# Patient Record
Sex: MALE | Race: OTHER | URBAN - METROPOLITAN AREA
[De-identification: names, ages, dates, MRNs, and addresses within clinical notes are randomized per-mention and may not be internally consistent; named-entity substitution may affect disease eponyms.]

---

## 2017-09-21 NOTE — PATIENT DISCUSSION
pt reduced va doesn't improve with refraction, mild reintal changes os with pain reported on left temple.

## 2017-11-07 NOTE — PATIENT DISCUSSION
NONPROLIFERATIVE DIABETIC RETINOPATHY: NO CSDME OU; RETURN FOR FOLLOW-UP AS SCHEDULED FOR DILATED EYE EXAM.

## 2018-01-31 ENCOUNTER — IMPORTED ENCOUNTER (OUTPATIENT)
Dept: URBAN - METROPOLITAN AREA CLINIC 9 | Facility: CLINIC | Age: 53
End: 2018-01-31

## 2018-08-23 NOTE — PATIENT DISCUSSION
NONPROLIFERATIVE DIABETIC RETINOPATHY:  I have talked with the patient about the nature of diabetic retinopathy and the potential for significant visual complications. For this reason regular follow-up with an ophthalmologist is essential.  I have also advised aggressive blood sugar and blood pressure control to minimize the risk of further damage.

## 2018-08-23 NOTE — PATIENT DISCUSSION
NONPROLIFERATIVE DIABETIC RETINOPATHY- CONTINUE TO FOLLOW WITH DR. Audrey Grove, KEEP BS UNDER CONTROL, ANY CHANGES CALL OFFICE FOR AN APPT

## 2018-08-23 NOTE — PATIENT DISCUSSION
CATARACTS ARE VISUALLY SIGNIFICANT .  WILL PROCEED WITH GLAUCOMA TESTING BEFORE SCHEDULING PRE- OP OR ANY SURGERY

## 2018-08-23 NOTE — PATIENT DISCUSSION
DERMATOCHALASIS OU: VISUALLY SIGNIFICANT WILL PROCEED WITH GLAUCOMA WORK UP AND PIN POINTING VISUAL PROBLEM FIRST .

## 2018-10-02 NOTE — PATIENT DISCUSSION
DRY EYES : Discussed with patient the importance of keeping the eye moist and the symptoms associated with dry eyes including blurry vision, tearing, burning, and gisel sensation. Advised patient to minimize use of any fans blowing directly on the face. Advised patient to continue with artificial tears 2-3 times daily.

## 2019-01-14 ENCOUNTER — IMPORTED ENCOUNTER (OUTPATIENT)
Dept: URBAN - METROPOLITAN AREA CLINIC 9 | Facility: CLINIC | Age: 54
End: 2019-01-14

## 2020-07-30 ENCOUNTER — IMPORTED ENCOUNTER (OUTPATIENT)
Dept: URBAN - METROPOLITAN AREA CLINIC 9 | Facility: CLINIC | Age: 55
End: 2020-07-30

## 2021-10-16 ASSESSMENT — KERATOMETRY
OS_K1POWER_DIOPTERS: 7.875
OS_K1POWER_DIOPTERS: 43.25
OS_AXISANGLE2_DEGREES: 179
OD_K1POWER_DIOPTERS: 7.875
OD_K1POWER_DIOPTERS: 43.25
OD_AXISANGLE2_DEGREES: 11
OD_AXISANGLE_DEGREES: 97
OD_K2POWER_DIOPTERS: 7.75
OD_AXISANGLE2_DEGREES: 8
OS_AXISANGLE2_DEGREES: 175
OD_AXISANGLE_DEGREES: 98
OS_K2POWER_DIOPTERS: 43.75
OS_AXISANGLE_DEGREES: 89
OS_AXISANGLE_DEGREES: 85
OS_K2POWER_DIOPTERS: 7.625
OD_K2POWER_DIOPTERS: 43.5
OD_AXISANGLE_DEGREES: 101
OS_AXISANGLE_DEGREES: 83
OS_AXISANGLE2_DEGREES: 173
OS_K2POWER_DIOPTERS: 43.75
OD_AXISANGLE2_DEGREES: 7
OD_K2POWER_DIOPTERS: 43.75
OS_K1POWER_DIOPTERS: 43
OD_K1POWER_DIOPTERS: 43

## 2021-10-16 ASSESSMENT — TONOMETRY
OD_IOP_MMHG: 17
OD_IOP_MMHG: 16
OD_IOP_MMHG: 21
OS_IOP_MMHG: 17
OS_IOP_MMHG: 18
OS_IOP_MMHG: 15

## 2021-10-16 ASSESSMENT — VISUAL ACUITY
OD_CC: 20/25 +2 SN
OD_SC: 20/25 SN
OS_SC: 20/20 SN
OS_CC: 20/20 SN
OD_SC: 20/25 -2 SN
OS_SC: 20/25 +2 SN
OD_CC: 20/20 SN
OD_CC: 20/20 SN
OS_CC: 20/20 SN
OS_CC: 20/20 SN
OS_SC: 20/20 -2 SN
OD_SC: 20/20 SN

## 2021-11-03 ENCOUNTER — ESTABLISHED PATIENT (OUTPATIENT)
Dept: URBAN - METROPOLITAN AREA CLINIC 9 | Facility: CLINIC | Age: 56
End: 2021-11-03

## 2021-11-03 DIAGNOSIS — H40.053: ICD-10-CM

## 2021-11-03 PROCEDURE — 92014 COMPRE OPH EXAM EST PT 1/>: CPT

## 2021-11-03 PROCEDURE — 92015 DETERMINE REFRACTIVE STATE: CPT

## 2021-11-03 PROCEDURE — 92133 CPTRZD OPH DX IMG PST SGM ON: CPT

## 2021-11-03 ASSESSMENT — KERATOMETRY
OS_K2POWER_DIOPTERS: 7.625
OS_K1POWER_DIOPTERS: 7.875
OD_AXISANGLE_DEGREES: 97
OS_AXISANGLE_DEGREES: 85
OD_AXISANGLE2_DEGREES: 7
OD_K1POWER_DIOPTERS: 7.875
OS_AXISANGLE2_DEGREES: 175
OD_K2POWER_DIOPTERS: 7.75

## 2021-11-03 ASSESSMENT — VISUAL ACUITY
OD_SC: 20/25-2
OD_GLARE: 20/30
OS_GLARE: 20/40
OS_SC: 20/20

## 2021-11-03 ASSESSMENT — TONOMETRY
OS_IOP_MMHG: 23
OD_IOP_MMHG: 23

## 2022-01-07 ENCOUNTER — DIAGNOSTICS ONLY (OUTPATIENT)
Dept: URBAN - METROPOLITAN AREA CLINIC 9 | Facility: CLINIC | Age: 57
End: 2022-01-07

## 2022-01-07 DIAGNOSIS — H40.053: ICD-10-CM

## 2022-01-07 PROCEDURE — 76514 ECHO EXAM OF EYE THICKNESS: CPT

## 2022-01-07 PROCEDURE — 92083 EXTENDED VISUAL FIELD XM: CPT

## 2022-01-17 ENCOUNTER — ESTABLISHED PATIENT (OUTPATIENT)
Dept: URBAN - METROPOLITAN AREA CLINIC 9 | Facility: CLINIC | Age: 57
End: 2022-01-17

## 2022-01-17 DIAGNOSIS — H40.053: ICD-10-CM

## 2022-01-17 PROCEDURE — 99213 OFFICE O/P EST LOW 20 MIN: CPT

## 2022-01-17 ASSESSMENT — TONOMETRY
OD_IOP_MMHG: 17
OS_IOP_MMHG: 17

## 2022-01-17 ASSESSMENT — VISUAL ACUITY
OD_SC: 20/25-1
OS_SC: 20/20-2

## 2022-05-23 ENCOUNTER — ESTABLISHED PATIENT (OUTPATIENT)
Dept: URBAN - METROPOLITAN AREA CLINIC 9 | Facility: CLINIC | Age: 57
End: 2022-05-23

## 2022-06-30 RX ORDER — METHYLPREDNISOLONE 4 MG/1
TABLET ORAL
COMMUNITY
Start: 2021-09-01

## 2022-06-30 RX ORDER — KETOTIFEN FUMARATE 0.35 MG/ML
SOLUTION/ DROPS OPHTHALMIC
COMMUNITY

## 2022-06-30 RX ORDER — IBUPROFEN 800 MG/1
TABLET ORAL
COMMUNITY

## 2022-06-30 RX ORDER — NAPROXEN SODIUM 220 MG
TABLET ORAL
COMMUNITY

## 2022-07-06 ENCOUNTER — ESTABLISHED PATIENT (OUTPATIENT)
Dept: URBAN - METROPOLITAN AREA CLINIC 9 | Facility: CLINIC | Age: 57
End: 2022-07-06

## 2022-07-06 DIAGNOSIS — H40.053: ICD-10-CM

## 2022-07-06 PROCEDURE — 99213 OFFICE O/P EST LOW 20 MIN: CPT

## 2022-07-06 ASSESSMENT — VISUAL ACUITY
OU_SC: 20/20
OS_SC: 20/20
OD_SC: 20/25

## 2022-07-06 ASSESSMENT — TONOMETRY
OS_IOP_MMHG: 16
OD_IOP_MMHG: 16

## 2023-01-20 ENCOUNTER — DIAGNOSTICS ONLY (OUTPATIENT)
Dept: URBAN - METROPOLITAN AREA CLINIC 9 | Facility: CLINIC | Age: 58
End: 2023-01-20

## 2023-01-20 DIAGNOSIS — H40.053: ICD-10-CM

## 2023-01-20 PROCEDURE — 92083 EXTENDED VISUAL FIELD XM: CPT

## 2024-12-16 ENCOUNTER — COMPREHENSIVE EXAM (OUTPATIENT)
Age: 59
End: 2024-12-16

## 2024-12-16 DIAGNOSIS — H25.13: ICD-10-CM

## 2024-12-16 DIAGNOSIS — H53.032: ICD-10-CM

## 2024-12-16 DIAGNOSIS — G43.B0: ICD-10-CM

## 2024-12-16 DIAGNOSIS — H04.123: ICD-10-CM

## 2024-12-16 DIAGNOSIS — H40.053: ICD-10-CM

## 2024-12-16 DIAGNOSIS — H49.12: ICD-10-CM

## 2024-12-16 PROCEDURE — 92015 DETERMINE REFRACTIVE STATE: CPT

## 2024-12-16 PROCEDURE — 92020 GONIOSCOPY: CPT

## 2024-12-16 PROCEDURE — 92133 CPTRZD OPH DX IMG PST SGM ON: CPT

## 2024-12-16 PROCEDURE — 92014 COMPRE OPH EXAM EST PT 1/>: CPT

## 2025-06-23 ENCOUNTER — FOLLOW UP (OUTPATIENT)
Age: 60
End: 2025-06-23

## 2025-06-23 DIAGNOSIS — H40.053: ICD-10-CM

## 2025-06-23 PROCEDURE — 99213 OFFICE O/P EST LOW 20 MIN: CPT
